# Patient Record
(demographics unavailable — no encounter records)

---

## 2024-12-02 NOTE — PHYSICAL EXAM
[Eyelids - No Xanthelasma] : the eyelids demonstrated no xanthelasmas [No Oral Cyanosis] : no oral cyanosis [Heart Rate And Rhythm] : heart rate and rhythm were normal [Heart Sounds] : normal S1 and S2 [Murmurs] : no murmurs present [Arterial Pulses Normal] : the arterial pulses were normal [FreeTextEntry1] : long standing chronic venous stasis changes to mid calves [Abdomen Soft] : soft [Abdomen Tenderness] : non-tender [Abnormal Walk] : normal gait [] : no ischemic changes [Oriented To Time, Place, And Person] : oriented to person, place, and time [Impaired Insight] : insight and judgment were intact [Affect] : the affect was normal

## 2024-12-02 NOTE — ASSESSMENT
[FreeTextEntry1] : #provoked BTK DVT: was on ppx, Apixaban. No issues with bleeding/bruising. -off DOAC -monitor for leg swelling, wear compression stockings   #CMY: likely takutsubo in the setting of acute neuro event. Did not tolerate GDMT due to hyoptension. Now resolved.  #Pericardial effusion: asymptomatic. Now resolved. Continue to monitor.   #cold sores, sent in script for Valtrex, she takes this as needed   F/u 6 months.

## 2024-12-02 NOTE — REASON FOR VISIT
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 Here for follow-up.  Repeat TTE with resolution of pericardial effusion. Legs are the same, swelling is stable. No blisters. Is having mouth ulcers again; she usually takes Valtrex for that. No chest pain. No SOB, RIOS. She is very tired but she does a lot in addition to still working. She takes care of her grandkids full time when they come home from school. She doesn't get much time to relax or take 5 minutes to do nothing. Overall doing well. No new clinical issues. Her  is present today with her.   Relevant prior hx:  Was found down and unresponsive at work. CPR initiated and ROSC achieved, she was taken to [facility] where initial EKG was concerning for LUTHER with elevated troponin s/p LHC which showed non-obstructive coronaries, TTE with LVEF 20% with findings concerning for LAD infarct vs Takotsubo CM. While in CCU at [place], patient developed focal neuro deficit, CT head showed diffuse SAH/SDH/IVH. 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 Transferred to [facility] NSCU on 4/21. Patient is s/p coil embo of left PCOMM on 4/21. NSCU course c/w acute blood loss anemia due to upper GI bleed s/p EGD with large ulcer with vessel in duodenal sweep treated with epi and bipolar coagulation, right soleal DVT. Viral gastroenteritis with + sapovirus. She ultimately was sent out to [facility]. She was sent out on dose reduced Apixaban for below the knee DVT, asymptomatic. Here for vascular medicine evaluation.

## 2024-12-02 NOTE — REVIEW OF SYSTEMS
[Feeling Fatigued] : not feeling fatigued [SOB] : no shortness of breath [Dyspnea on exertion] : not dyspnea during exertion [Chest Discomfort] : no chest discomfort [Lower Ext Edema] : no extremity edema [Orthopnea] : no orthopnea [PND] : no PND [Change In Color Of Skin] : change in skin color [Skin Lesions] : skin lesion(s): [Negative] : Heme/Lymph

## 2024-12-02 NOTE — REASON FOR VISIT
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 Here for follow-up.  Repeat TTE with resolution of pericardial effusion. Legs are the same, swelling is stable. No blisters. Is having mouth ulcers again; she usually takes Valtrex for that. No chest pain. No SOB, RIOS. She is very tired but she does a lot in addition to still working. She takes care of her grandkids full time when they come home from school. She doesn't get much time to relax or take 5 minutes to do nothing. Overall doing well. No new clinical issues. Her  is present today with her.   Relevant prior hx:  Was found down and unresponsive at work. CPR initiated and ROSC achieved, she was taken to [facility] where initial EKG was concerning for LUTHER with elevated troponin s/p LHC which showed non-obstructive coronaries, TTE with LVEF 20% with findings concerning for LAD infarct vs Takotsubo CM. While in CCU at [place], patient developed focal neuro deficit, CT head showed diffuse SAH/SDH/IVH. 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 Transferred to [facility] NSCU on 4/21. Patient is s/p coil embo of left PCOMM on 4/21. NSCU course c/w acute blood loss anemia due to upper GI bleed s/p EGD with large ulcer with vessel in duodenal sweep treated with epi and bipolar coagulation, right soleal DVT. Viral gastroenteritis with + sapovirus. She ultimately was sent out to [facility]. She was sent out on dose reduced Apixaban for below the knee DVT, asymptomatic. Here for vascular medicine evaluation.

## 2025-01-17 NOTE — DATA REVIEWED
[FreeTextEntry1] : Laboratory and radiology studies that were personally reviewed at today's visit are summarized in above and below: 2024:  Lumbar spine  xray :  Advanced Degenerative spondylosis; L4-S1 facet arthropathy. Lower lumbar spinous process hypertrophy/Bovina's disease. No compression fracture or subluxation..

## 2025-01-17 NOTE — HISTORY OF PRESENT ILLNESS
[FreeTextEntry1] : CHARLES CERVANTES is a 71 year old female, seen on today for arthritis in the shoulder's hands, knees and feet  pain comes and goes and occurs at times with certain movements.  4-2024:  PCOMM Aneurysm s/p embolization 4/21/24 course complicated by BTK DVT, acute blood loss from duodenal ulcer she notes that she has knowns back arthritis that was found in 4-2024 when she was hospitalized for a brain bleed. Sees Dr. Harp for lower back pain.  back pain didn't improve with tramadol but did get better with physical therapy for the brain aneurysm and Tylenol.   + stress eczema  no bowel disease no fevers, no chills, no weight loss   2 children - no MC, no pre-eclampsia, no eclampsia  no history of infertility  1 DVT in 4-2024 during hospitalization no family history of arthritis

## 2025-01-17 NOTE — PHYSICAL EXAM
[General Appearance - Alert] : alert [General Appearance - In No Acute Distress] : in no acute distress [Sclera] : the sclera and conjunctiva were normal [FreeTextEntry1] : vascular insufficiency changes in left leg

## 2025-01-17 NOTE — ASSESSMENT
[FreeTextEntry1] : CHARLES CERVANTES is a 71 year old female, seen on today for  migratory arthralgia  Muscle cramps in hands   Strong suspicion for OA/DJD but will eval for inflammatory arthritis   -> check x-rays   -> check blood work   -> likely to start PT after blood work and x-rays are back   -> trial of voltaren gel   muscle cramps (infrequent)   -> check electrolytes   -> check  cervical spine xray    Today's medical care services serve as the continuing focal point for needed health care services that are part of ongoing care related to a patient's one or more serious conditions or a complex condition.   Time spent on the encounter included but is not limited to, preparing to see the patient, obtaining and/or reviewing separately obtained history, performing the evaluation, counseling and educating, independently interpreting results with communication to the patient, order placement, referring and/or communicating with other health professionals as described, and documenting clinical information in the electronic health record.   CHRALES CERVANTES was counseled on the differential, workup, disease course, and treatment/management.   Education was provided to CHARLES CERAVNTES during this encounter. All questions and concerns were answered and addressed in detail.  CHARLES CERVANTES verbalized understanding and agreed to the plan.   CHARLES CERVANTES has been instructed to call for an earlier appointment if new symptoms develop in the interim. CHARLES CERVANTES has been instructed to make a follow-up appointment in 3 months

## 2025-01-22 NOTE — CONSULT LETTER
[Dear  ___] : Dear  [unfilled], [Consult Letter:] : I had the pleasure of evaluating your patient, [unfilled]. [Please see my note below.] : Please see my note below. [Consult Closing:] : Thank you very much for allowing me to participate in the care of this patient.  If you have any questions, please do not hesitate to contact me. [Sincerely,] : Sincerely, [FreeTextEntry3] : Gina Briseno MD, FSVS, FACS Associate Chief, Vascular & Endovascular Surgery , Vascular Surgery Fellowship & Residency  Associate Professor of Surgery, [employer] [facility] [employer] [facility] [employer] at \[facility] [place] [facility]\""/[facility]  Division of Vascular & Endovascular Surgery [facility] [address], [address] [place] [id number] Tel: [phone]

## 2025-01-22 NOTE — HISTORY OF PRESENT ILLNESS
[FreeTextEntry1] : 72 y/o F with Hx of PCOMM Aneurysm s/p embolization 4/21/24 course complicated by BTK DVT, acute blood loss from duodenal ulcer. Presenting for left leg venous ulcer. Ulcer opened up on Friday. Has worsened since then. Hx of recurring venous wounds in the same area. Reports pain to touch. No fevers or chills.

## 2025-01-22 NOTE — ASSESSMENT
[FreeTextEntry1] : 70 y/o F presenting for left venous ulcer with cellulitis    A/P C6 LLE venous disease: -US with LLE venous perforators around wound.and R GSV reflux -Offered unaboot, but patient declined -Start Bactrim 480 bid encourage skin care with moisturizer  compression stockings  no indication for venous interventions based on the duplex findings  -Follow up in 7-10d to monitor progress

## 2025-01-22 NOTE — PHYSICAL EXAM
[2+] : left 2+ [1+] : left 1+ [Ankle Swelling (On Exam)] : present [Ankle Swelling Bilaterally] : bilaterally  [Varicose Veins Of Lower Extremities] : bilaterally [] : bilaterally [Ankle Swelling On The Left] : moderate [Purpura] : purpura [Petechiae] : petechiae [Skin Ulcer] : ulcer [Skin Induration] : induration [de-identified] : NAD

## 2025-01-27 NOTE — HISTORY OF PRESENT ILLNESS
[FreeTextEntry1] : 70 yo female with PMH of chronic back pain, admit to [facility] 5/8-5/18/24 for Subarachnoid hemorrhage.  Patient found down at work with presumed heart attack, sent to [facility]  with MI, CPR was done x 10 min.  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  Hospital course was complicated by neurological changes. CT head showed diffused subarachnoid bleed R>L and very small 4th intraventricular bleed.  CT angio with 3mm R PCOM.  Then, transferred to [facility] s/p 4/22/24 coil embolization of Left PCOMM aneurysm.  secondary to aneurysm.    NSCU course c/w acute blood loss anemia due to upper GI bleed s/p EGD with large ulcer with vessel in duodenal sweep treated with epi and bipolar coagulation, right soleal DVT. Viral gastroenteritis with + sapovirus.  2/11/25: pt arrives for 8 month f/u s/p 08/09/2024 Interval 3 months follow up status post ruptured left fetal posterior cerebral artery aneurysm status post balloon-assisted coiling Post-procedure diagnosis: Complete occlusion of left fetal posterior cerebral artery aneurysm.  Access via R radial wrist.    6/11/24:  pt arrives for initial HFU, with daughter, ambulating with rolling walker, fully intact, chronic back pain, antalgic gait.  Otherwise, no complaints, no deficits, full strength.  Taking Xarelto 10 mg following with Dr. Kerline Francois.

## 2025-01-27 NOTE — REASON FOR VISIT
[FreeTextEntry1] : s/p 4/8-5/8/24 [facility] SAH/ MI, CTA showing 3 mm R POMM aneurysm, s/p 4/22/24 coil/embolization of L PCOMM.

## 2025-02-04 NOTE — PHYSICAL EXAM
[General Appearance - Alert] : alert [General Appearance - In No Acute Distress] : in no acute distress [Sclera] : the sclera and conjunctiva were normal [FreeTextEntry1] : vascular insufficiency changes in left leg, + yellow drainage, red surrounding tissue

## 2025-02-04 NOTE — DATA REVIEWED
[FreeTextEntry1] : Laboratory and radiology studies that were personally reviewed at today's visit are summarized in above and below: Echo ():  normal RAP  Ct chest (4-2024):  n onodules, small bilateral effusion,  2024:  Lumbar spine  xray :  Advanced Degenerative spondylosis; L4-S1 facet arthropathy. Lower lumbar spinous process hypertrophy/Towner's disease. No compression fracture or subluxation..

## 2025-02-04 NOTE — HISTORY OF PRESENT ILLNESS
[FreeTextEntry1] : CHARLES CERVANTES is a 71 year old female, seen on today for  + CCP, + SCL 70  has occasional joint pain with certain movements no joint pain or swelling today  no hair loss  denies Raynauds no gastric reflux  no exertional SOB -   Left LE Venous ulcer with drainage (yellow)  -> following up with vascular tomorrow - had improvement with  Bactrim but not complete.  Currently dripping with yellow liquid  History of Gout  2 episodes in her life - last one many years ago  father had gout    arthritis in the shoulder's hands, knees and feet  pain comes and goes and occurs at times with certain movements.  4-2024:  PCOMM Aneurysm s/p embolization 4/21/24 course complicated by BTK DVT, acute blood loss from duodenal ulcer she notes that she has knowns back arthritis that was found in 4-2024 when she was hospitalized for a brain bleed. Sees Dr. Harp for lower back pain.  back pain didn't improve with tramadol but did get better with physical therapy for the brain aneurysm and Tylenol.   + stress eczema  no bowel disease no fevers, no chills, no weight loss   2 children - no MC, no pre-eclampsia, no eclampsia  no history of infertility  1 DVT in 4-2024 during hospitalization no family history of arthritis

## 2025-02-06 NOTE — HISTORY OF PRESENT ILLNESS
[FreeTextEntry1] : 70 y/o F with Hx of PCOMM Aneurysm s/p embolization 4/21/24 course complicated by BTK DVT, acute blood loss from duodenal ulcer. She is here to evaluate LLE venous ulcer with cellulitis. Ulcer is nearly healed. There is minimal serous drainage on her sock. Pt reports burning, throbbing and itching around the site. She can't help scratching her left leg. Denies pain.  She has completed her course of Bactrim. Cellulitis improved significantly. States the wound site feels warm from time to time. Compliant with compression stockings only on the left leg. No problems with the right leg. No fevers or chills. Denies claudication or rest pain.

## 2025-02-06 NOTE — ASSESSMENT
[FreeTextEntry1] : Impression - venous insufficiency, left leg venous stasis ulcer is nearly healed, cellulitis resolved   Plan Conservative medical management - leg elevation, calf muscle exercises, knee high 20-30 mm hg compression stockings, moisturize skin Avoid leg scratching to prevent further skin breakdown Pt does not need antibiotics but pt is adamant about taking antibiotics for couple more days because of the burning, throbbing and the warmth she feels over the wound Can take Bactrim 400-80 mg BID for another 5 days (rx sent) Return to office in 1 month for wound check [Arterial/Venous Disease] : arterial/venous disease [Medication Management] : medication management [Ulcer Care] : ulcer care

## 2025-02-06 NOTE — PHYSICAL EXAM
[1+] : left 1+ [Ankle Swelling (On Exam)] : present [Ankle Swelling Bilaterally] : bilaterally  [Varicose Veins Of Lower Extremities] : bilaterally [] : bilaterally [Ankle Swelling On The Left] : moderate [Skin Ulcer] : ulcer [2+] : left 2+ [No Rash or Lesion] : No rash or lesion [Alert] : alert [Oriented to Person] : oriented to person [Oriented to Place] : oriented to place [Oriented to Time] : oriented to time [Calm] : calm [de-identified] : NAD [de-identified] : NCAT [de-identified] : unlabored breathing [FreeTextEntry1] : LLE venous stasis ulcer nearly healed scratch marks noted on calf minimal serous drainage LLE not warm to touch

## 2025-02-12 NOTE — ASSESSMENT
[FreeTextEntry1] : In summary patient presents to refill her Lap-Band.  3 cc was added at today's visit totaling 3 cc remaining.  Was able to drink 6 ounces of water in the office setting without complaints of dysphagia, GERD or vomiting.  Patient will remain on liquids for 2 days followed by 2 days of pureed diet after today's Lap-Band adjustment.  Patient will follow-up in 2 to 3 months for Lap-Band follow-up.  All questions and concerns answered at today's visit.

## 2025-02-12 NOTE — HISTORY OF PRESENT ILLNESS
[de-identified] : pt s/p lap band "many many years ago". She does not recall which surgeon did her Lap-Band and at which facility. She states it was over 20 years ago.  Last seen August 2024.  Patient needed to have lap band emptied katie to upcoming surgery. Patient has a significant medical history including multiple neurosurgeries and is having a upcoming surgery with a neurosurgeon on Friday 8 9-2024. She is having a revision of a previous reported coil embolism.  Patient's band was emptied and 5.5 cc was removed.  Presents to have Lap-Band follow-up today. [de-identified] : Patient has gained close to 30 pounds in the last 5 months since her Lap-Band was emptied.  She has done well since her procedure however has increased fluid retention specifically in her lower extremities.  Patient has a history of venous stasis insufficiency with venous stasis ulcers.  Was recently on a course of Bactrim oral for potential lower extremity infection.  Has completed her oral antibiotic course and presents to refill her Lap-Band today.  I will add 3 cc totaling 3 cc in her Lap-Band.

## 2025-02-12 NOTE — HISTORY OF PRESENT ILLNESS
[de-identified] : pt s/p lap band "many many years ago". She does not recall which surgeon did her Lap-Band and at which facility. She states it was over 20 years ago.  Last seen August 2024.  Patient needed to have lap band emptied katie to upcoming surgery. Patient has a significant medical history including multiple neurosurgeries and is having a upcoming surgery with a neurosurgeon on Friday 8 9-2024. She is having a revision of a previous reported coil embolism.  Patient's band was emptied and 5.5 cc was removed.  Presents to have Lap-Band follow-up today. [de-identified] : Patient has gained close to 30 pounds in the last 5 months since her Lap-Band was emptied.  She has done well since her procedure however has increased fluid retention specifically in her lower extremities.  Patient has a history of venous stasis insufficiency with venous stasis ulcers.  Was recently on a course of Bactrim oral for potential lower extremity infection.  Has completed her oral antibiotic course and presents to refill her Lap-Band today.  I will add 3 cc totaling 3 cc in her Lap-Band.

## 2025-02-12 NOTE — PROCEDURE
[FreeTextEntry1] : Skin prepped with alcohol Band port accessed with Gomez needle 3.0  mL added 3.0 mL total

## 2025-02-25 NOTE — PHYSICAL EXAM
[General Appearance - Alert] : alert [General Appearance - In No Acute Distress] : in no acute distress [Oriented To Time, Place, And Person] : oriented to person, place, and time [Sclera] : the sclera and conjunctiva were normal [Hearing Threshold Finger Rub Not [place]] : hearing was normal [Neck Appearance] : the appearance of the neck was normal [] : no respiratory distress [Edema] : there was no peripheral edema [Abnormal Walk] : normal gait [Involuntary Movements] : no involuntary movements were seen [Motor Tone] : muscle strength and tone were normal [Skin Color & Pigmentation] : normal skin color and pigmentation

## 2025-02-25 NOTE — PHYSICAL EXAM
[General Appearance - Alert] : alert [General Appearance - In No Acute Distress] : in no acute distress [Oriented To Time, Place, And Person] : oriented to person, place, and time [Sclera] : the sclera and conjunctiva were normal [Hearing Threshold Finger Rub Not [place]] : hearing was normal [Neck Appearance] : the appearance of the neck was normal [] : no respiratory distress [Edema] : there was no peripheral edema [Abnormal Walk] : normal gait [Involuntary Movements] : no involuntary movements were seen [Motor Tone] : muscle strength and tone were normal [Skin Color & Pigmentation] : normal skin color and pigmentation

## 2025-02-25 NOTE — PHYSICAL EXAM
[General Appearance - Alert] : alert [General Appearance - In No Acute Distress] : in no acute distress [Oriented To Time, Place, And Person] : oriented to person, place, and time [Sclera] : the sclera and conjunctiva were normal [Hearing Threshold Finger Rub Not [place]] : hearing was normal [Neck Appearance] : the appearance of the neck was normal [] : no respiratory distress [Edema] : there was no peripheral edema [Abnormal Walk] : normal gait [Involuntary Movements] : no involuntary movements were seen [Motor Tone] : muscle strength and tone were normal [Skin Color & Pigmentation] : normal skin color and pigmentation

## 2025-02-26 NOTE — REASON FOR VISIT
[FreeTextEntry1] : s/p 4/8-5/8/24 [facility] SAH/ MI, CTA showing 3 mm R POMM aneurysm, s/p 4/22/24 coil/embolization of L PCOMM.

## 2025-02-26 NOTE — REASON FOR VISIT
[FreeTextEntry1] : s/p 4/8-5/8/24 [facility] SAH/ MI, CTA showing 3 mm R POMM aneurysm, s/p 4/22/24 coil/embolization of L PCOMM.

## 2025-02-26 NOTE — HISTORY OF PRESENT ILLNESS
[FreeTextEntry1] : 72 yo female with PMH of chronic back pain, admit to [facility] 5/8-5/18/24 for Subarachnoid hemorrhage.  Patient found down at work with presumed heart attack, sent to [facility]  with MI, CPR was done x 10 min.  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  Hospital course was complicated by neurological changes. CT head showed diffused subarachnoid bleed R>L and very small 4th intraventricular bleed.  CT angio with 3mm R PCOM.  Then, transferred to [facility] s/p 4/22/24 coil embolization of Left PCOMM aneurysm.  secondary to aneurysm.    NSCU course c/w acute blood loss anemia due to upper GI bleed s/p EGD with large ulcer with vessel in duodenal sweep treated with epi and bipolar coagulation, right soleal DVT. Viral gastroenteritis with + sapovirus.  2/25/25: pt arrives for 8 month f/u s/p 08/09/2024 Interval 3 months follow up s/p 4/22/24 coil embolization of Left PCOMM aneurysm.  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  Access via R radial wrist. Today, she arrives feeling well, reports post procedure had f/u with GI and vascular for venous insufficiency - advised to also keep PCP involved for coordination of care.  6/11/24:  pt arrives for initial HFU, with daughter, ambulating with rolling walker, fully intact, chronic back pain, antalgic gait.  Otherwise, no complaints, no deficits, full strength.  Taking Xarelto 10 mg following with Dr. Kerline Francois.

## 2025-02-26 NOTE — ASSESSMENT
[FreeTextEntry1] : s/p 4/8-5/8/24 [facility] SAH/ MI, CTA showing 3 mm R POMM aneurysm, s/p 4/22/24 coil/embolization of L PCOMM. s/p 08/09/2024 Interval 3 months follow up s/p 4/22/24 coil embolization of Left PCOMM aneurysm.  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  Access via R radial wrist. Following with Dr. Francois for DVT, cleared to stop Xarelto   No family history of aneurysm Never smoker  PLAN:. MRA head with - asap RTO 1 month TTM fine

## 2025-02-26 NOTE — REASON FOR VISIT
[FreeTextEntry1] : s/p 4/8-5/8/24 [facility] SAH/ MI, CTA showing 3 mm R POMM aneurysm, s/p 4/22/24 coil/embolization of L PCOMM.

## 2025-02-26 NOTE — ASSESSMENT
[FreeTextEntry1] : s/p 4/8-5/8/24 [facility] SAH/ MI, CTA showing 3 mm R POMM aneurysm, s/p 4/22/24 coil/embolization of L PCOMM. s/p 08/09/2024 Interval 3 months follow up s/p 4/22/24 coil embolization of Left PCOMM aneurysm.  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  Access via R radial wrist. Following with Dr. Francois for DVT, cleared to stop Xarelto   No family history of aneurysm Never smoker  PLAN:. MRA head with - asap RTO 1 month TTM fine

## 2025-02-26 NOTE — END OF VISIT
[FreeTextEntry3] :  I have seen and evaluated patient with NP Rosi Barkley who has completed the documentation above.

## 2025-02-26 NOTE — HISTORY OF PRESENT ILLNESS
[FreeTextEntry1] : 70 yo female with PMH of chronic back pain, admit to [facility] 5/8-5/18/24 for Subarachnoid hemorrhage.  Patient found down at work with presumed heart attack, sent to [facility]  with MI, CPR was done x 10 min.  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  Hospital course was complicated by neurological changes. CT head showed diffused subarachnoid bleed R>L and very small 4th intraventricular bleed.  CT angio with 3mm R PCOM.  Then, transferred to [facility] s/p 4/22/24 coil embolization of Left PCOMM aneurysm.  secondary to aneurysm.    NSCU course c/w acute blood loss anemia due to upper GI bleed s/p EGD with large ulcer with vessel in duodenal sweep treated with epi and bipolar coagulation, right soleal DVT. Viral gastroenteritis with + sapovirus.  2/25/25: pt arrives for 8 month f/u s/p 08/09/2024 Interval 3 months follow up s/p 4/22/24 coil embolization of Left PCOMM aneurysm.  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  Access via R radial wrist. Today, she arrives feeling well, reports post procedure had f/u with GI and vascular for venous insufficiency - advised to also keep PCP involved for coordination of care.  6/11/24:  pt arrives for initial HFU, with daughter, ambulating with rolling walker, fully intact, chronic back pain, antalgic gait.  Otherwise, no complaints, no deficits, full strength.  Taking Xarelto 10 mg following with Dr. Kerline Francois.

## 2025-02-26 NOTE — HISTORY OF PRESENT ILLNESS
[FreeTextEntry1] : 72 yo female with PMH of chronic back pain, admit to [facility] 5/8-5/18/24 for Subarachnoid hemorrhage.  Patient found down at work with presumed heart attack, sent to [facility]  with MI, CPR was done x 10 min.  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  Hospital course was complicated by neurological changes. CT head showed diffused subarachnoid bleed R>L and very small 4th intraventricular bleed.  CT angio with 3mm R PCOM.  Then, transferred to [facility] s/p 4/22/24 coil embolization of Left PCOMM aneurysm.  secondary to aneurysm.    NSCU course c/w acute blood loss anemia due to upper GI bleed s/p EGD with large ulcer with vessel in duodenal sweep treated with epi and bipolar coagulation, right soleal DVT. Viral gastroenteritis with + sapovirus.  2/25/25: pt arrives for 8 month f/u s/p 08/09/2024 Interval 3 months follow up s/p 4/22/24 coil embolization of Left PCOMM aneurysm.  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  Access via R radial wrist. Today, she arrives feeling well, reports post procedure had f/u with GI and vascular for venous insufficiency - advised to also keep PCP involved for coordination of care.  6/11/24:  pt arrives for initial HFU, with daughter, ambulating with rolling walker, fully intact, chronic back pain, antalgic gait.  Otherwise, no complaints, no deficits, full strength.  Taking Xarelto 10 mg following with Dr. Kerline Francois.

## 2025-02-26 NOTE — ASSESSMENT
[FreeTextEntry1] : s/p 4/8-5/8/24 [facility] SAH/ MI, CTA showing 3 mm R POMM aneurysm, s/p 4/22/24 coil/embolization of L PCOMM. s/p 08/09/2024 Interval 3 months follow up s/p 4/22/24 coil embolization of Left PCOMM aneurysm.  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  Access via R radial wrist. Following with Dr. Francois for DVT, cleared to stop Xarelto   No family history of aneurysm Never smoker  PLAN:. MRA head with - asap RTO 1 month TTM fine

## 2025-03-14 NOTE — HISTORY OF PRESENT ILLNESS
[FreeTextEntry1] : 72 y/o F with Hx of PCOMM Aneurysm s/p embolization 4/21/24 course complicated by BTK DVT presents to evaluate left lower extremity.  Cellulitis resolved. Left distal medial calf wound is healed. Itching and erythema improved. She saw her dermatologist and was prescribed a cream to help with pruritus. No new complaints. Reports stable baseline leg swelling. Compliant with leg elevation and compression stockings. Denies claudication, rest pain or wounds.

## 2025-03-14 NOTE — ASSESSMENT
[FreeTextEntry1] : Impression - venous insufficiency, LLE wound is healed and cellulitis has resolved   Plan Conservative medical management - leg elevation, calf muscle exercises, knee high 20-30 mm hg compression stockings, moisturize skin Return to office as needed [Arterial/Venous Disease] : arterial/venous disease [Medication Management] : medication management

## 2025-03-14 NOTE — PHYSICAL EXAM
[1+] : left 1+ [2+] : left 2+ [Ankle Swelling (On Exam)] : present [Ankle Swelling Bilaterally] : bilaterally  [Varicose Veins Of Lower Extremities] : bilaterally [] : bilaterally [Ankle Swelling On The Right] : mild [No Rash or Lesion] : No rash or lesion [Alert] : alert [Oriented to Person] : oriented to person [Oriented to Place] : oriented to place [Oriented to Time] : oriented to time [Calm] : calm [de-identified] : NAD [de-identified] : NCAT [de-identified] : unlabored breathing [FreeTextEntry1] : LLE venous ulcer is healed bilateral lower extremities soft, warm and nontender mild venous stasis changes with dry skin and hyperpigmentation mild bilateral leg edema no wounds

## 2025-03-21 NOTE — HISTORY OF PRESENT ILLNESS
[FreeTextEntry1] : 72 yo female with PMH of chronic back pain, admit to [facility] 5/8-5/18/24 for Subarachnoid hemorrhage.  Patient found down at work with presumed heart attack, sent to [facility]  with MI, CPR was done x 10 min.  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  Hospital course was complicated by neurological changes. CT head showed diffused subarachnoid bleed R>L and very small 4th intraventricular bleed.  CT angio with 3mm R PCOM.  Then, transferred to [facility] s/p 4/22/24 coil embolization of Left PCOMM aneurysm.  secondary to aneurysm.    NSCU course c/w acute blood loss anemia due to upper GI bleed s/p EGD with large ulcer with vessel in duodenal sweep treated with epi and bipolar coagulation, right soleal DVT. Viral gastroenteritis with + sapovirus.  4/1/25: pt arrives for 1 month f/u with MRA head w/wo of 3/14/25 for review in pacs/nw  2/25/25: pt arrives for 8 month f/u s/p 08/09/2024 Interval 3 months follow up s/p 4/22/24 coil embolization of Left PCOMM aneurysm.  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  Access via R radial wrist. Today, she arrives feeling well, reports post procedure had f/u with GI and vascular for venous insufficiency - advised to also keep PCP involved for coordination of care.  6/11/24:  pt arrives for initial HFU, with daughter, ambulating with rolling walker, fully intact, chronic back pain, antalgic gait.  Otherwise, no complaints, no deficits, full strength.  Taking Xarelto 10 mg following with Dr. Kerline Francois.

## 2025-03-21 NOTE — RESULTS/DATA
[FreeTextEntry1] : 3/14/25 MRA brain w/wo in pacs/nw IMPRESSION:  1. ANTERIOR CIRCULATION:   Intact.  2. POSTERIOR CIRCULATION:  Patent.  Well embolization material adjacent to posterior cerebral arterial fetal origin ostium with routine posttreatment appearance.  --- End of Report ---

## 2025-04-01 NOTE — REASON FOR VISIT
[Home] : at home, [unfilled] , at the time of the visit. [Medical Office: ([facility])___] : at the medical office located in  [Telephone (audio)] : This telephonic visit was provided via audio only technology. [Verbal consent obtained from patient] : the patient, [unfilled]

## 2025-04-01 NOTE — REASON FOR VISIT
[Home] : at home, [unfilled] , at the time of the visit. [Medical Office: ([facility])___] : at the medical office located in  [Telephone (audio)] : This telephonic visit was provided via audio only technology. [Verbal consent obtained from patient] : the patient, [unfilled]

## 2025-04-01 NOTE — REASON FOR VISIT
[Home] : at home, [unfilled] , at the time of the visit. [Medical Office: ([facility])___] : at the medical office located in  [Telephone (audio)] : This telephonic visit was provided via audio only technology. [Verbal consent obtained from patient] : the patient, [unfilled]

## 2025-04-07 NOTE — ASSESSMENT
[FreeTextEntry1] : s/p 4/8-5/8/24 [facility] SAH/ MI, CTA showing 3 mm R POMM aneurysm, s/p 4/22/24 coil/embolization of L PCOMM. s/p 08/09/2024 Interval 3 months follow up s/p 4/22/24 coil embolization of Left PCOMM aneurysm.  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  Access via R radial wrist.  Following with Dr. Francois for DVT, cleared to stop Xarelto   No family history of aneurysm Never smoker  Reviewed MRA showing aneurysm sealed/ no new aneurysms.  PLAN:. MRA head with - 1 year (3/2026) RTO 1 year with MRA done

## 2025-04-07 NOTE — HISTORY OF PRESENT ILLNESS
[FreeTextEntry1] : 72 yo female with PMH of chronic back pain, admit to [facility] 5/8-5/18/24 for Subarachnoid hemorrhage.  Patient found down at work with presumed heart attack, sent to [facility]  with MI, CPR was done x 10 min.  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  Hospital course was complicated by neurological changes. CT head showed diffused subarachnoid bleed R>L and very small 4th intraventricular bleed.  CT angio with 3mm R PCOM.  Then, transferred to [facility] s/p 4/22/24 coil embolization of Left PCOMM aneurysm.  secondary to aneurysm.    NSCU course c/w acute blood loss anemia due to upper GI bleed s/p EGD with large ulcer with vessel in duodenal sweep treated with epi and bipolar coagulation, right soleal DVT. Viral gastroenteritis with + sapovirus.  4/1/25: pt arrives via TTM for 1 month f/u with MRA head w/wo of 3/14/25 for review in pacs/nw showing aneurysm sealed/ no new aneurysms.  She is feeling well, occasional headaches relieved with Tylenol.   2/25/25: pt arrives for 8 month f/u s/p 08/09/2024 Interval 3 months follow up s/p 4/22/24 coil embolization of Left PCOMM aneurysm.  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  Access via R radial wrist. Today, she arrives feeling well, reports post procedure had f/u with GI and vascular for venous insufficiency - advised to also keep PCP involved for coordination of care.  6/11/24:  pt arrives for initial HFU, with daughter, ambulating with rolling walker, fully intact, chronic back pain, antalgic gait.  Otherwise, no complaints, no deficits, full strength.  Taking Xarelto 10 mg following with Dr. Kerline Francois.

## 2025-04-07 NOTE — ASSESSMENT
[FreeTextEntry1] : s/p 4/8-5/8/24 [facility] SAH/ MI, CTA showing 3 mm R POMM aneurysm, s/p 4/22/24 coil/embolization of L PCOMM. s/p 08/09/2024 Interval 3 months follow up s/p 4/22/24 coil embolization of Left PCOMM aneurysm.  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  Access via R radial wrist.  Following with Dr. Francois for DVT, cleared to stop Xarelto   No family history of aneurysm Never smoker  Reviewed MRA showing aneurysm sealed/ no new aneurysms.  PLAN:. MRA head with - 1 year (3/2026) RTO 1 year with MRA done

## 2025-04-07 NOTE — HISTORY OF PRESENT ILLNESS
[FreeTextEntry1] : 72 yo female with PMH of chronic back pain, admit to [facility] 5/8-5/18/24 for Subarachnoid hemorrhage.  Patient found down at work with presumed heart attack, sent to [facility]  with MI, CPR was done x 10 min.  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  Hospital course was complicated by neurological changes. CT head showed diffused subarachnoid bleed R>L and very small 4th intraventricular bleed.  CT angio with 3mm R PCOM.  Then, transferred to [facility] s/p 4/22/24 coil embolization of Left PCOMM aneurysm.  secondary to aneurysm.    NSCU course c/w acute blood loss anemia due to upper GI bleed s/p EGD with large ulcer with vessel in duodenal sweep treated with epi and bipolar coagulation, right soleal DVT. Viral gastroenteritis with + sapovirus.  4/1/25: pt arrives via TTM for 1 month f/u with MRA head w/wo of 3/14/25 for review in pacs/nw showing aneurysm sealed/ no new aneurysms.  She is feeling well, occasional headaches relieved with Tylenol.   2/25/25: pt arrives for 8 month f/u s/p 08/09/2024 Interval 3 months follow up s/p 4/22/24 coil embolization of Left PCOMM aneurysm.  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  Access via R radial wrist. Today, she arrives feeling well, reports post procedure had f/u with GI and vascular for venous insufficiency - advised to also keep PCP involved for coordination of care.  6/11/24:  pt arrives for initial HFU, with daughter, ambulating with rolling walker, fully intact, chronic back pain, antalgic gait.  Otherwise, no complaints, no deficits, full strength.  Taking Xarelto 10 mg following with Dr. Kerline Francois.

## 2025-04-07 NOTE — HISTORY OF PRESENT ILLNESS
[FreeTextEntry1] : 70 yo female with PMH of chronic back pain, admit to [facility] 5/8-5/18/24 for Subarachnoid hemorrhage.  Patient found down at work with presumed heart attack, sent to [facility]  with MI, CPR was done x 10 min.  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  Hospital course was complicated by neurological changes. CT head showed diffused subarachnoid bleed R>L and very small 4th intraventricular bleed.  CT angio with 3mm R PCOM.  Then, transferred to [facility] s/p 4/22/24 coil embolization of Left PCOMM aneurysm.  secondary to aneurysm.    NSCU course c/w acute blood loss anemia due to upper GI bleed s/p EGD with large ulcer with vessel in duodenal sweep treated with epi and bipolar coagulation, right soleal DVT. Viral gastroenteritis with + sapovirus.  4/1/25: pt arrives via TTM for 1 month f/u with MRA head w/wo of 3/14/25 for review in pacs/nw showing aneurysm sealed/ no new aneurysms.  She is feeling well, occasional headaches relieved with Tylenol.   2/25/25: pt arrives for 8 month f/u s/p 08/09/2024 Interval 3 months follow up s/p 4/22/24 coil embolization of Left PCOMM aneurysm.  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  Access via R radial wrist. Today, she arrives feeling well, reports post procedure had f/u with GI and vascular for venous insufficiency - advised to also keep PCP involved for coordination of care.  6/11/24:  pt arrives for initial HFU, with daughter, ambulating with rolling walker, fully intact, chronic back pain, antalgic gait.  Otherwise, no complaints, no deficits, full strength.  Taking Xarelto 10 mg following with Dr. Kerline Francois.

## 2025-04-07 NOTE — ASSESSMENT
[FreeTextEntry1] : s/p 4/8-5/8/24 [facility] SAH/ MI, CTA showing 3 mm R POMM aneurysm, s/p 4/22/24 coil/embolization of L PCOMM. s/p 08/09/2024 Interval 3 months follow up s/p 4/22/24 coil embolization of Left PCOMM aneurysm.  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  Access via R radial wrist.  Following with Dr. Francois for DVT, cleared to stop Xarelto   No family history of aneurysm Never smoker  Reviewed MRA showing aneurysm sealed/ no new aneurysms.  PLAN:. MRA head with - 1 year (3/2026) RTO 1 year with MRA done

## 2025-06-09 NOTE — HISTORY OF PRESENT ILLNESS
[de-identified] : Patient has gained 10 pounds since last being seen 3 months ago.  Presents for Lap-Band adjustment today. [de-identified] : pt s/p lap band "many many years ago". She does not recall which surgeon did her Lap-Band and at which facility. She states it was over 20 years ago.  Patient needed to have lap band emptied katie to previous surgery. Patient has a significant medical history including multiple neurosurgeries and had her last surgery with a neurosurgeon on Friday 8 9-2024. She is underwent a revision of a previous reported coil embolism.  Patient's band was emptied with 5.5cc prior to sx.  slow refill started at previous visit as 3cc was added back to an empty lap band. Now has 3cc.

## 2025-06-09 NOTE — ASSESSMENT
[FreeTextEntry1] : In summary, patient presents for Lap-Band adjustment today.  At this visit 1.5 cc was added totaling 4.5 cc in Lap-Band.  Of note, prior to loosening band preprocedurally, patient had 5.5 cc in her Lap-Band.  Recommended liquids for 2 days followed by 2 days of pureed diet after today's Lap-Band adjustment.  Will see patient in 1 to 2 months for Lap-Band follow-up.  All questions and concerns answered at today's visit.

## 2025-06-09 NOTE — PROCEDURE
[FreeTextEntry1] : Skin prepped with alcohol Band port accessed with Gomez needle   1.5mL added 4.5mL total

## 2025-06-09 NOTE — HISTORY OF PRESENT ILLNESS
[de-identified] : Patient has gained 10 pounds since last being seen 3 months ago.  Presents for Lap-Band adjustment today. [de-identified] : pt s/p lap band "many many years ago". She does not recall which surgeon did her Lap-Band and at which facility. She states it was over 20 years ago.  Patient needed to have lap band emptied katie to previous surgery. Patient has a significant medical history including multiple neurosurgeries and had her last surgery with a neurosurgeon on Friday 8 9-2024. She is underwent a revision of a previous reported coil embolism.  Patient's band was emptied with 5.5cc prior to sx.  slow refill started at previous visit as 3cc was added back to an empty lap band. Now has 3cc.

## 2025-06-20 NOTE — ADDENDUM
[FreeTextEntry1] :  I, Kaitlyn Vizcaino, acted solely as a scribe for Dr. Juan Jose Harp MD on this date 06/20/2025    All medical record entries made by the Scribe were at my, Dr. Juan Jose Harp MD., direction and personally dictated by me on 06/20/2025 . I have reviewed the chart and agree that the record accurately reflects my personal performance of the history, physical exam, assessment and plan. I have also personally directed, reviewed, and agreed with the chart.

## 2025-06-20 NOTE — HISTORY OF PRESENT ILLNESS
[de-identified] : Patient is a 71 year old female who presents for an eval of midback pain. She reports that she fell off a chair last week. Her pain started after the fall. She states that breathing in worsens her pain. She has been taking Tylenol.   06.24.25 Patient is a 70 year old female who presents for f/u eval of lower bp. Patient states overall she is doing better. Sxs exacerbated when getting in/out of bed. This pain dissipates when lying flat or when she begins walking. Denies pain with walking. Continuing with PT.    06.10.24 Patient is a 70-year-old female who presents for initial eval of lower back pain. Denies radiating LE sxs. Able to walk 5 blocks. Patient states she fell unconscious about 2 months ago as a result of a brain bleed. Details she was resuscitated and had a broken rib. She was in the hospital for over 2 weeks and reports interactable back pain. Denies improvement of sxs with Tramadol.

## 2025-06-20 NOTE — ASSESSMENT
[FreeTextEntry1] :  I had a lengthy discussion with the patient in regards to treatment plan and diagnosis. There are no red flag findings on imaging nor are there any red flag findings on clinical exams.  Therefore we will proceed with a course of conservative treatment. This would include walking as much as possible, Tylenol, NSAIDs as medically indicated.  The patient will follow up with me in 3 months.  I encouraged the patient to follow-up sooner if there are any new or worsening symptoms.

## 2025-06-20 NOTE — PHYSICAL EXAM
[de-identified] :  Lumbar Physical Exam   forward pitched posture, can correct to neutral   Gait - Normal   Station - Normal   Sagittal balance - Normal   Compensatory mechanism? - None   Reflexes Patellar - normal Gastroc - normal Clonus - No   Hip Exam - Normal   Straight leg raise - none   Pulses - 2+ dp/pt   Range of motion - normal   Sensation Sensation intact to light touch in L1, L2, L3, L4, L5 and S1 dermatomes bilaterally   Motor IP Quad HS TA Gastroc EHL Right 5/5 5/5 5/5 5/5 5/5 5/5 Left 5/5 5/5 5/5 5/5 5/5 5/5  [de-identified] : scoli rads  slightly increased kyphosis in thoracic spine  SVA slightly progressed as compared to last year  previous imaging: CT Abdomen and Pelvis  No evidence of Fracture

## 2025-07-24 NOTE — REVIEW OF SYSTEMS
[Heartburn] : heartburn [Negative] : Heme/Lymph [FreeTextEntry7] : avoids triggers:  som/ OJ butter- / pending colonoscopy -

## 2025-07-24 NOTE — HEALTH RISK ASSESSMENT
[Good] : ~his/her~  mood as  good [Never] : Never [Patient reported mammogram was normal] : Patient reported mammogram was normal [Patient reported PAP Smear was normal] : Patient reported PAP Smear was normal [HIV test declined] : HIV test declined [Hepatitis C test declined] : Hepatitis C test declined [None] : None [With Significant Other] : lives with significant other [With Family] : lives with family [Employed] : employed [] :  [Feels Safe at Home] : Feels safe at home [Fully functional (bathing, dressing, toileting, transferring, walking, feeding)] : Fully functional (bathing, dressing, toileting, transferring, walking, feeding) [Fully functional (using the telephone, shopping, preparing meals, housekeeping, doing laundry, using] : Fully functional and needs no help or supervision to perform IADLs (using the telephone, shopping, preparing meals, housekeeping, doing laundry, using transportation, managing medications and managing finances) [Smoke Detector] : smoke detector [Carbon Monoxide Detector] : carbon monoxide detector [Safety elements used in home] : safety elements used in home [Seat Belt] :  uses seat belt [No] : No [0] : 2) Feeling down, depressed, or hopeless: Not at all (0) [PHQ-2 Negative - No further assessment needed] : PHQ-2 Negative - No further assessment needed [Time Spent: ___ Minutes] : I spent [unfilled] minutes performing a depression screening for this patient. [de-identified] : see hpi [de-identified] : walking as [place] - takes walker- if long walking  [de-identified] : soda maybe- drinks h20 -  [YGB9Dfige] : 0 [Change in mental status noted] : No change in mental status noted [Language] : denies difficulty with language [High Risk Behavior] : no high risk behavior [Reports changes in hearing] : Reports no changes in hearing [Reports changes in vision] : Reports no changes in vision [Reports changes in dental health] : Reports no changes in dental health [MammogramDate] : 2024 [MammogramComments] : density/ US [PapSmearDate] : 2023 [PapSmearComments] : GYN - Dr Wilson gonsales  [BoneDensityDate] : 2024 [BoneDensityComments] : osteopenia L hip  [ColonoscopyComments] : over ten years-last one was wnl [de-identified] : dtr [FreeTextEntry2] : - High school in [facility] [place]  [de-identified] : Opth is due- will go back -  [de-identified] : hx of skin cancer- adv derm UTD with TBSE  [FreeTextEntry4] : [facility] Brigette Roberson dt

## 2025-07-24 NOTE — PLAN
[FreeTextEntry1] : All problems, medications and allergies were assessed and reviewed with the patient. The patients' blood was drawn in office and will be followed and evaluated for any issues. Patient was told to notify the office if any issues arise. Patient agreeable with plan   half quan / fruit today

## 2025-07-24 NOTE — HEALTH RISK ASSESSMENT
[Good] : ~his/her~  mood as  good [Never] : Never [Patient reported mammogram was normal] : Patient reported mammogram was normal [Patient reported PAP Smear was normal] : Patient reported PAP Smear was normal [HIV test declined] : HIV test declined [Hepatitis C test declined] : Hepatitis C test declined [None] : None [With Significant Other] : lives with significant other [With Family] : lives with family [Employed] : employed [] :  [Feels Safe at Home] : Feels safe at home [Fully functional (bathing, dressing, toileting, transferring, walking, feeding)] : Fully functional (bathing, dressing, toileting, transferring, walking, feeding) [Fully functional (using the telephone, shopping, preparing meals, housekeeping, doing laundry, using] : Fully functional and needs no help or supervision to perform IADLs (using the telephone, shopping, preparing meals, housekeeping, doing laundry, using transportation, managing medications and managing finances) [Smoke Detector] : smoke detector [Carbon Monoxide Detector] : carbon monoxide detector [Safety elements used in home] : safety elements used in home [Seat Belt] :  uses seat belt [No] : No [0] : 2) Feeling down, depressed, or hopeless: Not at all (0) [PHQ-2 Negative - No further assessment needed] : PHQ-2 Negative - No further assessment needed [Time Spent: ___ Minutes] : I spent [unfilled] minutes performing a depression screening for this patient. [de-identified] : see hpi [de-identified] : walking as [place] - takes walker- if long walking  [de-identified] : soda maybe- drinks h20 -  [LCC7Arpsq] : 0 [Change in mental status noted] : No change in mental status noted [Language] : denies difficulty with language [High Risk Behavior] : no high risk behavior [Reports changes in hearing] : Reports no changes in hearing [Reports changes in vision] : Reports no changes in vision [Reports changes in dental health] : Reports no changes in dental health [MammogramDate] : 2024 [MammogramComments] : density/ US [PapSmearDate] : 2023 [PapSmearComments] : GYN - Dr Wilson gonsales  [BoneDensityDate] : 2024 [BoneDensityComments] : osteopenia L hip  [ColonoscopyComments] : over ten years-last one was wnl [de-identified] : dtr [FreeTextEntry2] : - High school in [employer] [facility] [place]  [de-identified] : Opth is due- will go back -  [de-identified] : hx of skin cancer- adv derm UTD with TBSE  [FreeTextEntry4] : [facility] Brigette Roberson dt

## 2025-07-24 NOTE — HISTORY OF PRESENT ILLNESS
[FreeTextEntry1] : check up  [de-identified] : 07/23/2025  71 year  old female  presents for check up - back pain relieved by advil  PMH: postmenopausal , arthritis  peripheral vascular disease-  long standing chronic venous stasis changes to mid calves. sees spine MD for low back pain - doing PT    hx of   stress-induced cardiomyopathy, ICH due to brain aneurysm (s/p intervention), GI bleed from duodenal ulcer (s/p intervention) and right soleal DVT hx of cardiac arrest  PSH: tiago, lap band many years ago-  revision of a previous reported coil embolism. Lap-Band adjustment. Hospitalizations : Social hx: never a smoker,  Fam hx:   father- Gout-   Allergies: biaxin, cipro Meds: xarelto 10 qd (was stopped by cards) ppi prn  tylenol prn/advil prn  Supplements: josef vites- multivitamin  (without iron )    Health Maintenance: Immunizations:     covid- UTD   Declining flu vaccine -education provided.- preference    shingrix- declining states  had shingles- 3 times     pna- will give info -        May 21, 2024 pt accompanied by dtr  4/19/ 2024 [place] " found unresponsive - was resuscited-   - was transferred- to [place]- ICU stay - then downgraded- during in pt stay GI bleed - [facility]   hospital course copied and pasted from scm :" Hospital Course:  Discharge Date 18-May-2024  Admission Date 08-May-2024 16:36  Reason for Admission SDH  Hospital Course   HPI:  This is a 69 YO with no PMH  (doesn't see Drs) no ACAP who was found down and  unresponsive at work. CPR initiated and ROSC achieved, she was taken to  [facility] where initial EKG was concerning for LUTHER with elevated  troponin s/p LHC which showed non-obstructive coronaries, TTE with LVEF 20%  with findings concerning for LAD infarct vs Takotsubo CM. While in CCU at  [place], patient developed focal neuro deficit, CT head showed diffuse  SAH/SDH/IVH. 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 Transferred to [facility] NSCU on  4/21. Patient is s/p coil embo of left PCOMM on 4/21. NSCU course c/w acute  blood loss anemia due to upper GI bleed s/p EGD with large ulcer with vessel in  duodenal sweep treated with epi and bipolar coagulation, right soleal DVT.  Viral gastroenteritis with + sapovirus.    Patient was evaluated by PM&R and therapy for functional deficits, gait/ADL  impairments and acute rehabilitation was recommended. Patient was discharged to  [facility] IRU on 5/8/24.    Pt was [facility] upon [facility]  Admitted with gait instabilty, ADL, and functional impairments.    Rehab Course significant:  - back pain > chest pain (noncardiac related; s/p CPR); with intermittent  radiculopathy > regimen: lidocaine patch, methocarbamol, tylenol, and  gabapentin.  Gradually improved.  At time of discharge: back pain is tolerable,  no neuropathic pain to lower extremities.  Has chest discomfort (now better  than prior)  - methocarbamol and gabapentin discontinued  - Adjustment disorder and traumatic experience  > seen by neuropsychologist,  has recreation therapy (poor participation), and started on lexapro.  Overall  doing well, she's just longing for home, doesn't feel she needs any medication  for mood.   70 year  old female  presents to establish care was previously seeing :                 wasnt seeing other PCP           will ask for medical records to be sent over Fam hx:   Allergies: NKDA  Meds: xarelto 10 qd  tylenol prn  Supplements: josef vites     dc plan:"  Assessment and Plan:   Assessment	 70F with recent hospitalization after cardiac arrest (unclear reason, cath showed non-obstructive CAD), with course complicated by stress-induced cardiomyopathy, ICH due to brain aneurysm (s/p intervention), GI bleed from duodenal ulcer (s/p intervention) and right soleal DVT, now in acute rehab  #ICH due to brain aneurysm -completed PT/OT/SLP pre rehab team -BP controlled  #Stress-induced cardiomyopathy, improved. -EF 20% at first, most recent EF 42% - Off losartan and Toprol due to hypotension  -Addition of aldactone is discretionary at this time due to recovering EF > 40% and BP soft. also farxiga differed for recent hypotension and soft BP - patient needs to follow up with her cardiologist OP sooner than later for further adjustment of meds  #Back pain, -Pain control, PT  #PUD -c/w PPI  #Right soleal DVT #right peroneal vein DVT -below the knee DVT, risks of full dose A/C > benefits due to recent ICH - on xarelto prophylactic dose - Repeat Doppler LE (5/15)-Right Calf Peroneal and soleal vein DVTs, unchanged without central propagation. - needs repeat doppler in 1 week OP. return ot ER if leg swelling/pain, chesp pain, shortness of breath or any other concerning symptoms.   #Gastric Ulcer - melena in hospital - EGD showed large ulcer with vessel in duodenal sweep treated with epi and bipolar coagulation. - Protonix  40 mg PO BID for 2 weeks - Viral gastroenteritis with + sapovirus - avoid NSAIDs  stable for DC to home today with close OP f/u  d.w rehab team at [facility]"     -

## 2025-07-24 NOTE — HISTORY OF PRESENT ILLNESS
[FreeTextEntry1] : check up  [de-identified] : 07/23/2025  71 year  old female  presents for check up - back pain relieved by advil  PMH: postmenopausal , arthritis  peripheral vascular disease-  long standing chronic venous stasis changes to mid calves. sees spine MD for low back pain - doing PT    hx of   stress-induced cardiomyopathy, ICH due to brain aneurysm (s/p intervention), GI bleed from duodenal ulcer (s/p intervention) and right soleal DVT hx of cardiac arrest  PSH: tiago, lap band many years ago-  revision of a previous reported coil embolism. Lap-Band adjustment. Hospitalizations : Social hx: never a smoker,  Fam hx:   father- Gout-   Allergies: biaxin, cipro Meds: xarelto 10 qd (was stopped by cards) ppi prn  tylenol prn/advil prn  Supplements: josef vites- multivitamin  (without iron )    Health Maintenance: Immunizations:     covid- UTD   Declining flu vaccine -education provided.- preference    shingrix- declining states  had shingles- 3 times     pna- will give info -        May 21, 2024 pt accompanied by dtr  4/19/ 2024 [place] " found unresponsive - was resuscited-   - was transferred- to [place]- ICU stay - then downgraded- during in pt [facility] bleed - [facility]   hospital course copied and pasted from scm :" Hospital Course:  Discharge Date 18-May-2024  Admission Date 08-May-2024 16:36  Reason for Admission SDH  Hospital Course   HPI:  This is a 69 YO with no PMH  (doesn't see Drs) no ACAP who was found down and  unresponsive at work. CPR initiated and ROSC achieved, she was taken to  [facility] where initial EKG was concerning for LUTHER with elevated  troponin s/p LHC which showed non-obstructive coronaries, TTE with LVEF 20%  with findings concerning for LAD infarct vs Takotsubo CM. While in CCU at  [place], patient developed focal neuro deficit, CT head showed diffuse  SAH/SDH/IVH. 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 Transferred to [facility] NSCU on  4/21. Patient is s/p coil embo of left PCOMM on 4/21. NSCU course c/w acute  blood loss anemia due to upper GI bleed s/p EGD with large ulcer with vessel in  duodenal sweep treated with epi and bipolar coagulation, right soleal DVT.  Viral gastroenteritis with + sapovirus.    Patient was evaluated by PM&R and therapy for functional deficits, gait/ADL  impairments and acute rehabilitation was recommended. Patient was discharged to  [facility] IRU on 5/8/24.    Pt was stable upon rehab admission to  [facility].  Admitted with gait instabilty, ADL, and functional impairments.    Rehab Course significant:  - back pain > chest pain (noncardiac related; s/p CPR); with intermittent  radiculopathy > regimen: lidocaine patch, methocarbamol, tylenol, and  gabapentin.  Gradually improved.  At time of discharge: back pain is tolerable,  no neuropathic pain to lower extremities.  Has chest discomfort (now better  than prior)  - methocarbamol and gabapentin discontinued  - Adjustment disorder and traumatic experience  > seen by neuropsychologist,  has recreation therapy (poor participation), and started on lexapro.  Overall  doing well, she's just longing for home, doesn't feel she needs any medication  for mood.   70 year  old female  presents to establish care was previously seeing :                 wasnt seeing other PCP           will ask for medical records to be sent over Fam hx:   Allergies: NKDA  Meds: xarelto 10 qd  tylenol prn  Supplements: josef vites     dc plan:"  Assessment and Plan:   Assessment	 70F with recent hospitalization after cardiac arrest (unclear reason, cath showed non-obstructive CAD), with course complicated by stress-induced cardiomyopathy, ICH due to brain aneurysm (s/p intervention), GI bleed from duodenal ulcer (s/p intervention) and right soleal DVT, now in acute rehab  #ICH due to brain aneurysm -completed PT/OT/SLP pre rehab team -BP controlled  #Stress-induced cardiomyopathy, improved. -EF 20% at first, most recent EF 42% - Off losartan and Toprol due to hypotension  -Addition of aldactone is discretionary at this time due to recovering EF > 40% and BP soft. also farxiga differed for recent hypotension and soft BP - patient needs to follow up with her cardiologist OP sooner than later for further adjustment of meds  #Back pain, -Pain control, PT  #PUD -c/w PPI  #Right soleal DVT #right peroneal vein DVT -below the knee DVT, risks of full dose A/C > benefits due to recent ICH - on xarelto prophylactic dose - Repeat Doppler LE (5/15)-Right Calf Peroneal and soleal vein DVTs, unchanged without central propagation. - needs repeat doppler in 1 week OP. return ot ER if leg swelling/pain, chesp pain, shortness of breath or any other concerning symptoms.   #Gastric Ulcer - melena in hospital - EGD showed large ulcer with vessel in duodenal sweep treated with epi and bipolar coagulation. - Protonix  40 mg PO BID for 2 weeks - Viral gastroenteritis with + sapovirus - avoid NSAIDs  stable for DC to home today with close OP f/u  d.w rehab team at [facility]"     -